# Patient Record
Sex: FEMALE | Race: WHITE | NOT HISPANIC OR LATINO | Employment: UNEMPLOYED | URBAN - METROPOLITAN AREA
[De-identification: names, ages, dates, MRNs, and addresses within clinical notes are randomized per-mention and may not be internally consistent; named-entity substitution may affect disease eponyms.]

---

## 2021-08-20 ENCOUNTER — LAB (OUTPATIENT)
Dept: LAB | Facility: CLINIC | Age: 46
End: 2021-08-20
Payer: COMMERCIAL

## 2021-08-20 ENCOUNTER — OFFICE VISIT (OUTPATIENT)
Dept: BEHAVIORAL HEALTH | Facility: CLINIC | Age: 46
End: 2021-08-20
Payer: COMMERCIAL

## 2021-08-20 VITALS
BODY MASS INDEX: 15.12 KG/M2 | TEMPERATURE: 98 F | WEIGHT: 80 LBS | HEART RATE: 78 BPM | RESPIRATION RATE: 16 BRPM | SYSTOLIC BLOOD PRESSURE: 120 MMHG | DIASTOLIC BLOOD PRESSURE: 64 MMHG

## 2021-08-20 DIAGNOSIS — F17.200 TOBACCO USE DISORDER: ICD-10-CM

## 2021-08-20 DIAGNOSIS — F11.20 OPIOID USE DISORDER, SEVERE, DEPENDENCE (H): Primary | ICD-10-CM

## 2021-08-20 DIAGNOSIS — F15.20 METHAMPHETAMINE USE DISORDER, SEVERE (H): ICD-10-CM

## 2021-08-20 DIAGNOSIS — F11.20 OPIOID USE DISORDER, SEVERE, DEPENDENCE (H): ICD-10-CM

## 2021-08-20 DIAGNOSIS — Z11.3 SCREEN FOR STD (SEXUALLY TRANSMITTED DISEASE): ICD-10-CM

## 2021-08-20 DIAGNOSIS — G43.909 MIGRAINE WITHOUT STATUS MIGRAINOSUS, NOT INTRACTABLE, UNSPECIFIED MIGRAINE TYPE: ICD-10-CM

## 2021-08-20 DIAGNOSIS — D50.9 MICROCYTIC ANEMIA: ICD-10-CM

## 2021-08-20 PROCEDURE — 99205 OFFICE O/P NEW HI 60 MIN: CPT | Performed by: FAMILY MEDICINE

## 2021-08-20 PROCEDURE — 87491 CHLMYD TRACH DNA AMP PROBE: CPT | Performed by: FAMILY MEDICINE

## 2021-08-20 PROCEDURE — 87591 N.GONORRHOEAE DNA AMP PROB: CPT | Performed by: FAMILY MEDICINE

## 2021-08-20 PROCEDURE — 87340 HEPATITIS B SURFACE AG IA: CPT

## 2021-08-20 PROCEDURE — 86706 HEP B SURFACE ANTIBODY: CPT

## 2021-08-20 PROCEDURE — 36415 COLL VENOUS BLD VENIPUNCTURE: CPT

## 2021-08-20 PROCEDURE — 86803 HEPATITIS C AB TEST: CPT

## 2021-08-20 PROCEDURE — 87389 HIV-1 AG W/HIV-1&-2 AB AG IA: CPT

## 2021-08-20 PROCEDURE — 86704 HEP B CORE ANTIBODY TOTAL: CPT

## 2021-08-20 PROCEDURE — 86780 TREPONEMA PALLIDUM: CPT

## 2021-08-20 RX ORDER — BUPRENORPHINE AND NALOXONE 8; 2 MG/1; MG/1
1 FILM, SOLUBLE BUCCAL; SUBLINGUAL DAILY
COMMUNITY
End: 2021-08-20

## 2021-08-20 RX ORDER — BUPRENORPHINE AND NALOXONE 8; 2 MG/1; MG/1
1 FILM, SOLUBLE BUCCAL; SUBLINGUAL 2 TIMES DAILY
Qty: 20 FILM | Refills: 0 | Status: SHIPPED | OUTPATIENT
Start: 2021-08-20 | End: 2021-09-28

## 2021-08-20 RX ORDER — TOPIRAMATE 50 MG/1
TABLET, FILM COATED ORAL
Qty: 60 TABLET | Refills: 0 | Status: SHIPPED | OUTPATIENT
Start: 2021-08-20 | End: 2021-09-28

## 2021-08-20 NOTE — PROGRESS NOTES
[facility] Initial Visit    ASSESSMENT/PLAN                                                      1. Opioid use disorder, severe, dependence (H)  Pt reports a ~3 month history of using heroin/fentanyl by inhaled route(s.)  Last reported use 8/7 or 8/8. Started buprenorphine 8mg/day 8/9/21.  Pt experiencing cravings with current dose of buprenorphine.    Recommend increase in buprenorphine to 16mg/day  Encouraged her to fill rx for naloxone and keep in her possession   Baseline ID screening labs discussed, will obtain today.      - buprenorphine HCl-naloxone HCl (SUBOXONE) 8-2 MG per film; Place 1 Film under the tongue 2 times daily  Dispense: 20 Film; Refill: 0  - naloxone (NARCAN) 4 MG/0.1ML nasal spray; Spray 1 spray (4 mg) into one nostril alternating nostrils once as needed for opioid reversal every 2-3 minutes until assistance arrives  Dispense: 0.2 mL; Refill: 11  - Hepatitis C Screen Reflex to HCV RNA Quant and Genotype; Future  - HIV Antigen Antibody Combo; Future  - Hepatitis B core antibody; Future  - Hepatitis B Surface Antibody; Future  - Hepatitis B surface antigen; Future    2. Methamphetamine use disorder, severe (H)  Starting topiramate as noted to address cravings; pt reports she has tolerated this in the past   - topiramate (TOPAMAX) 50 MG tablet; Start 1 po qhsx7d, then increase to 1 po bid  Dispense: 60 tablet; Refill: 0    3. Tobacco use disorder  MI regarding cessation future visits    4. Migraine without status migrainosus, not intractable, unspecified migraine type  topiramate as noted  - topiramate (TOPAMAX) 50 MG tablet; Start 1 po qhsx7d, then increase to 1 po bid  Dispense: 60 tablet; Refill: 0    5. Screen for STD (sexually transmitted disease)  - Hepatitis C Screen Reflex to HCV RNA Quant and Genotype; Future  - HIV Antigen Antibody Combo; Future  - NEISSERIA GONORRHOEA PCR  - CHLAMYDIA TRACHOMATIS PCR  - Treponema Abs w Reflex to RPR and Titer; Future  -  Hepatitis B core antibody; Future  - Hepatitis B Surface Antibody; Future  - Hepatitis B surface antigen; Future    6. Microcytic anemia  S/p gastric bypass not on supplements likely at least a contributor; will refer to PCP for further evaluation and management.     Return in about 1 week (around 8/27/2021) for Follow up, with me, in person.      SUBJECTIVE                                                      CC/HPI:  Angie Franks is a 46 year old female with PMH bipolar disorder, methamphetamine use disorder starting age 20, tobacco use disorder, and opioid use disorder starting 5/2021 who presents to the Recovery [facility] for initial visit. Pt decided to seek treatment at this time requesting to continue buprenorphine which had been started 8/9/21 by [facility] ED physicians after pt presented from [facility] facility requesting treatment for opioid withdrawal.       Substance Use History :  Opioids: Pt states she began smoking heroin ~5/2021 after being introduced to this by one of her current roommates. She described smoking about 1/4 g daily, last use 8/8/21.  She started buprenorphine  8/9/21 from [facility] ED physicians after she presented there seeking treatment for opioid withdrawal.    IV drug use: No   History of overdose: No  Previous treatments : Yes: 2x including [facility] and [facility] 2019  Longest period of sobriety: 7 years from methamphetamine while incarcerated  Medical complications related to substance use: per patient, low blood pressure, syncope  Hepatitis C Status: negative per patient, last screen 2017  HIV Status: negative per patient, last screen 2016    Other recent substance use:  Stimulants: Angie's drug of choice is meth; hx of meth addiction x 20-25 years. Was in retirement x~5 years until July 2019. Returned to use of methamphetamine upon release from retirement. She did attend treatment at [facility] after relapsing, and remained sober from methamphetamine until 12/2020.  Last  use 8/07/21  Sedatives/hypnotics/anxiolytics: Xanax, snorted, would use if no money for heroin. Last use 8/8/21  Alcohol: denies  Tobacco: yes  Cannabis: yes, in process to obtain medical marijuana. Will use to increase appetite  Hallucinogens: denies  Behavioral addictions: denies     Patient reports her mother used heroin when pregnant with her.    [place] Prescription Drug Monitoring Program Reviewed:  Yes; as expected    Past Medical History:   Diagnosis Date     ASCUS favoring benign 09/28/2015    negative HPV     Bipolar 1 disorder (H)      Fibromyalgia 08/01/2012     Hx of herpes genitalis 09/28/2015     Obesity      Periodic headache syndrome, not intractable      Social anxiety disorder      PAST PSYCHIATRIC HISTORY:  Diagnoses- bipolar disorder, anxiety, PTSD  Suicide Attempts: No   Hospitalizations: Yes, age 14  No current mental health providers. Was a patient at [facility] prior to going to [facility] 5 years ago.     Past Surgical History:   Procedure Laterality Date     ABDOMEN SURGERY  Age 15    Laproscopy, Endometriosis     CHOLECYSTECTOMY  01/01/2010     GASTRIC BYPASS  04/2013     GYN SURGERY      Tubal pregnancy repair     TUBAL LIGATION  2013       Medications:  Buprenorphine/naloxone 8mg/2mg film, taking 1/2 SL bid  No current outpatient medications on file prior to visit.  No current facility-administered medications on file prior to visit.      Allergies   Allergen Reactions     Codeine Sulfate Hives     Erythromycin      Morphine Sulfate Other (See Comments)     Localized redness if given IV         Family History   Problem Relation Age of Onset     Substance Abuse Mother          Social History  Housing status: with friends, stable housing. Roommate uses heroin.  Employment status: Unemployed, not seeking work  Relationship status:   Children: 5 children--3 children over the age of 18, 1 child adopted out (age 17), 1 child (age 14) with his parental grandparents    REVIEW OF  SYSTEMS:  Endorses cravings with current dose of buprenorphine.   Also endorses occasional cravings for methamphetamine.   Has been experiencing daily posterior headaches lately      OBJECTIVE                                                        Clinical Opioid Withdrawal Scale (COWS)    Resting Pulse Rate  0  =  <=80    Sweating    (over past 1/2 hour) 1  =  subjective report of chills or flushing   Restlessness  1  =  reports difficulty sitting still, but is able to do so   Pupil size  0  =  pupils pinned or normal size for room light   Bone or Joint Aches    (acute only) 1  =  mild diffuse discomfort   Runny nose or tearing    (unrelated to cold/allergies) 2  =  nose running or tearing   GI Upset    (over past 1/2 hour) 2  =  nausea or loose stool   Tremor    (outstretched hands) 2  =  slight tremor observable   Yawning    (during assessment) 0  =  no yawning   Anxiety/Irritability 1  =  patient reports increasing irritability or anxiousness   Gooseflesh skin 3  =  piloerection or skin can be felt or hairs standing up on arms     TOTAL SCORE  Add column for score   13       /64   Pulse 78   Temp 98  F (36.7  C)   Resp 16   Wt 36.3 kg (80 lb)   LMP 07/18/2021 (Approximate)   BMI 15.12 kg/m      Physical Exam  Constitutional:       Appearance: She is not toxic-appearing.   HENT:      Head: Normocephalic and atraumatic.   Eyes:      General: No scleral icterus.     Extraocular Movements: Extraocular movements intact.   Pulmonary:      Effort: Pulmonary effort is normal.   Neurological:      Mental Status: She is alert and oriented to person, place, and time.      Cranial Nerves: No facial asymmetry.      Coordination: Coordination normal.      Gait: Gait normal.   Psychiatric:         Attention and Perception: Attention and perception normal.         Mood and Affect: Mood is anxious and depressed. Affect is tearful.         Speech: Speech is rapid and pressured.         Behavior: Behavior is  cooperative.         Thought Content: Thought content does not include suicidal ideation.      Comments: Insight and judgement are fair         Labs:    UDS: amphetamines, buprenorphine, methamphetamines and THC pt states last use of methamphetamine was 8/7/21  *[facility] urine drug screen does not screen for Fentanyl    HCV, HBV, HIV studies pending at time of documentation    Recent Results (from the past 240 hour(s))   NEISSERIA GONORRHOEA PCR    Collection Time: 08/20/21 12:19 PM    Specimen: Urine, Voided   Result Value Ref Range    Neisseria gonorrhoeae Negative Negative   CHLAMYDIA TRACHOMATIS PCR    Collection Time: 08/20/21 12:19 PM    Specimen: Urine, Voided   Result Value Ref Range    Chlamydia trachomatis Negative Negative   Treponema Abs w Reflex to RPR and Titer    Collection Time: 08/20/21 12:23 PM   Result Value Ref Range    Treponema Antibody Total Nonreactive Nonreactive       7/8/21 from [facility]:   Ref Range & Units 1 mo ago   WBC 4.00 - 10.00 k/cmm 4.56        RBC 3.90 - 5.20 m/cmm 4.50        Hgb 11.5 - 15.7 g/dL 7.8Low         Hematocrit 34.0 - 45.0 % 28.8Low         MCV 80.0 - 100.0 fL 64.0Low         MCH 25.0 - 32.0 pg 17.3Low         MCHC 31.0 - 36.0 g/dL 27.1Low         RDW 11.5 - 14.5 % 17.1High         Plt 150 - 400 k/cmm 487High         MPV 6.5 - 12.5 fL 9.4        Automated Abs Neutrophil 1.70 - 6.50 k/cmm 1.99    Comment: Preliminary ANC, final result to follow.       NRBC 0.0 - 0.0 % 0.0        Abs Immature Granulocyte 0.00 - 0.09 k/cmm 0.01    Comment: The Immature Granulocyte Absolute count contains metamyelocytes and myelocytes.       Abs Neutrophil 1.70 - 6.50 k/cmm 1.99        Abs Lymphocyte 0.80 - 4.00 k/cmm 2.13        Abs Monocyte 0.20 - 1.00 k/cmm 0.32        Abs Eosinophil 0.00 - 0.60 k/cmm 0.08        Abs Basophil 0.00 - 0.20 k/cmm 0.03        Hypochromasi   Slight        Elliptocyte   Slight        Resulting Agency  [facility]   Specimen Collected: 07/08/21 12:15 PM       Ref Range & Units 1 mo ago   TSH 0.27 - 4.20 mIU/L 1.83      Ref Range & Units 1 mo ago    Sodium 135 - 148 mEq/L 137        Potassium 3.5 - 5.3 mEq/L 3.6        Chloride 92 - 108 mEq/L 105        CO2 22 - 30 mEq/L 24        AnGap 8 - 16 mEq/L 8        Glucose 70 - 100 mg/dL 105High         BUN 6 - 20 mg/dL 9        Creatinine 0.50 - 1.00 mg/dL 0.59        Calcium 8.6 - 10.0 mg/dL 8.8        eGFR, High >=60 ml/min/1.73m2 >120    Comment: Calculated using CKD-EPI equation       eGFR, Low >=60 ml/min/1.73m2 110    Comment: Calculated using CKD-EPI equation           Patient counseling completed today:  Discussed mechanism of action, potential risks/benefits/side effects of medications and other recommendations above.  Discussed risk of precipitated withdrawal with initiation/resumption of buprenorphine in the presence of full opioid agonists.  Reviewed directions for initiation/resumption of buprenorphine to reduce risk of precipitated withdrawal and maximize efficacy.  Recommend pt keep naloxone in their possession and reviewed other aspects of harm reduction to reduce risk of overdose with return to use.   Recommended avoiding concurrent use of alcohol, benzodiazepines or other sedatives with buprenorphine or other opioids.  Discussed importance of avoiding isolation, building a network of supportive relationships, avoiding people/places/things associated with past use to reduce risk of relapse; including motivational interviewing regarding psychosocial treatment for addiction.     At least 60 min spent in review of medical record,  review, obtaining histories, reviewing symptoms, discussing pt's goals for treatment, counseling noted above.    [facility]  [address] [place] [address] [id number] [phone]

## 2021-08-21 LAB
C TRACH DNA SPEC QL NAA+PROBE: NEGATIVE
N GONORRHOEA DNA SPEC QL NAA+PROBE: NEGATIVE
T PALLIDUM AB SER QL: NONREACTIVE

## 2021-08-23 LAB
HBV CORE AB SERPL QL IA: NONREACTIVE
HBV SURFACE AB SERPL IA-ACNC: 153.1 M[IU]/ML
HBV SURFACE AG SERPL QL IA: NONREACTIVE
HCV AB SERPL QL IA: NONREACTIVE
HIV 1+2 AB+HIV1 P24 AG SERPL QL IA: NONREACTIVE

## 2021-08-27 ENCOUNTER — TELEPHONE (OUTPATIENT)
Dept: BEHAVIORAL HEALTH | Facility: CLINIC | Age: 46
End: 2021-08-27

## 2021-09-25 ENCOUNTER — HOSPITAL ENCOUNTER (EMERGENCY)
Facility: CLINIC | Age: 46
Discharge: HOME | End: 2021-09-25
Admitting: EMERGENCY MEDICINE
Payer: COMMERCIAL

## 2021-09-25 VITALS
DIASTOLIC BLOOD PRESSURE: 71 MMHG | SYSTOLIC BLOOD PRESSURE: 114 MMHG | RESPIRATION RATE: 16 BRPM | OXYGEN SATURATION: 100 % | HEART RATE: 107 BPM | TEMPERATURE: 98.3 F

## 2021-09-25 PROCEDURE — 250N000013 HC RX MED GY IP 250 OP 250 PS 637: Performed by: EMERGENCY MEDICINE

## 2021-09-25 PROCEDURE — 250N000011 HC RX IP 250 OP 636: Performed by: EMERGENCY MEDICINE

## 2021-09-25 PROCEDURE — 999N000104 HC STATISTIC NO CHARGE

## 2021-09-25 RX ORDER — ONDANSETRON 2 MG/ML
4 INJECTION INTRAMUSCULAR; INTRAVENOUS ONCE
Status: DISCONTINUED | OUTPATIENT
Start: 2021-09-25 | End: 2021-09-25

## 2021-09-25 RX ORDER — IBUPROFEN 600 MG/1
600 TABLET, FILM COATED ORAL ONCE
Status: COMPLETED | OUTPATIENT
Start: 2021-09-25 | End: 2021-09-25

## 2021-09-25 RX ORDER — ONDANSETRON 4 MG/1
4 TABLET, ORALLY DISINTEGRATING ORAL ONCE
Status: COMPLETED | OUTPATIENT
Start: 2021-09-25 | End: 2021-09-25

## 2021-09-25 RX ADMIN — IBUPROFEN 600 MG: 600 TABLET ORAL at 21:31

## 2021-09-25 RX ADMIN — ONDANSETRON 4 MG: 4 TABLET, ORALLY DISINTEGRATING ORAL at 21:31

## 2021-09-26 NOTE — ED NOTES
Pt has been called more than 3x in the lobby and pt not be found. Pt did not inform any staff that she was leaving. Medical declination form was not given to the pt

## 2021-09-26 NOTE — ED TRIAGE NOTES
Pt reports being on heroin in the past and went to [facility] for detox. Pt was then sent to [facility] and started on suboxone. Pt was wanting to get off suboxone and stopped taking it last dose Friday AM 4 mg (1/2 her normal dose). Pt presents with body aches, nausea, diarrhea. Pt wanting to get back on suboxone.

## 2021-09-28 ENCOUNTER — HOSPITAL ENCOUNTER (EMERGENCY)
Facility: CLINIC | Age: 46
Discharge: STILL ADMITTED OR ADMINISTRATIVE | End: 2021-09-28
Payer: COMMERCIAL

## 2021-09-28 ENCOUNTER — TELEPHONE (OUTPATIENT)
Dept: BEHAVIORAL HEALTH | Facility: CLINIC | Age: 46
End: 2021-09-28

## 2021-09-28 ENCOUNTER — OFFICE VISIT (OUTPATIENT)
Dept: BEHAVIORAL HEALTH | Facility: CLINIC | Age: 46
End: 2021-09-28
Payer: COMMERCIAL

## 2021-09-28 VITALS — HEART RATE: 97 BPM | SYSTOLIC BLOOD PRESSURE: 126 MMHG | DIASTOLIC BLOOD PRESSURE: 86 MMHG

## 2021-09-28 VITALS
HEART RATE: 112 BPM | RESPIRATION RATE: 18 BRPM | SYSTOLIC BLOOD PRESSURE: 111 MMHG | DIASTOLIC BLOOD PRESSURE: 64 MMHG | TEMPERATURE: 98 F | OXYGEN SATURATION: 99 %

## 2021-09-28 DIAGNOSIS — F15.20 METHAMPHETAMINE USE DISORDER, SEVERE (H): ICD-10-CM

## 2021-09-28 DIAGNOSIS — F17.200 TOBACCO USE DISORDER: ICD-10-CM

## 2021-09-28 DIAGNOSIS — F11.20 OPIOID USE DISORDER, SEVERE, DEPENDENCE (H): Primary | ICD-10-CM

## 2021-09-28 LAB — HCG UR QL: NEGATIVE

## 2021-09-28 PROCEDURE — 81025 URINE PREGNANCY TEST: CPT | Performed by: FAMILY MEDICINE

## 2021-09-28 PROCEDURE — 80307 DRUG TEST PRSMV CHEM ANLYZR: CPT | Performed by: FAMILY MEDICINE

## 2021-09-28 PROCEDURE — 99214 OFFICE O/P EST MOD 30 MIN: CPT | Performed by: FAMILY MEDICINE

## 2021-09-28 RX ORDER — BUPRENORPHINE AND NALOXONE 8; 2 MG/1; MG/1
1 FILM, SOLUBLE BUCCAL; SUBLINGUAL 2 TIMES DAILY
Qty: 20 FILM | Refills: 0 | Status: SHIPPED | OUTPATIENT
Start: 2021-09-28 | End: 2021-10-22

## 2021-09-28 NOTE — NURSING NOTE
[facility]      Rooming information:  Approximate last use of illicit opioids: 8/8/2021  Taking buprenorphine? No, last took on 9/25/2021 As prescribed? No, states was only taking 1/2 a strip a day  Number of buprenorphine films/tablets remaining currently: 0  Side effects related to buprenorphine (constipation, dry mouth, sedation?) No   Narcan currently available: Yes  Other recent substance use:    NICOTINE-Yes:    Methamphetamines-9/26/2021, CBD  If using nicotine, ready to quit? No    Point of care urine drug screen positive for: amphetamines, methamphetamines and THC  *[facility] urine drug screen does not screen for Fentanyl    Pregnancy Status   LMP: unknown  Birth control/barriers: tubal  Urine pregnancy test specimen obtained and sent to lab:yes    Insurance needs: active    Housing needs: stable    Contact information up to date? yes    3rd Party Involvement none today (please obtain RANDI if pt would like to include)    Primary care provider: [facility]     Mental health provider: [facility] (follow up on MH referral if needed)    Zoe Rivera CMA  September 28, 2021  1:33 PM

## 2021-09-28 NOTE — TELEPHONE ENCOUNTER
She wants to get back on Soboxone-she jumped to I don't want to be on it forever-she talked about being sick and the frustration of trying to get help-she wants to be sober bad because she is sick of being and addict-she wants to move the addiction-she wants to be able to talk to teenager to help then with recovery-live by my example and I'm here to help-she can see the self-she will have a script in her hand and be able to think I'm starting my life to recovery today and on my way to feeling better if and when a challenge presents its self.

## 2021-09-28 NOTE — PROGRESS NOTES
[facility] Return Visit    ASSESSMENT/PLAN                                                    1. Opioid use disorder, severe, dependence (H)  Pt experiencing withdrawal symptoms after self directed taper off buprenorphine, would like to resume buprenorphine, and is interested in XR buprenorphine.  Last use other opioids 8/8/21.   Resume buprenorphine with 4mg, titrate up to 16mg/day as needed to address symptoms.   Will make arrangements for Sublocade when pt has been taking at least 8mg sublingual buprenorphine daily for one week.   - HCG qualitative urine; Standing  - Fentanyl Urine, Qualitative; Standing  - buprenorphine HCl-naloxone HCl (SUBOXONE) 8-2 MG per film; Place 1 Film under the tongue 2 times daily  Dispense: 20 Film; Refill: 0  - HCG qualitative urine  - Fentanyl Urine, Qualitative    2. Methamphetamine use disorder, severe (H)  Pt reporting one use in the last month on 9/26/21, noted decreased cravings with use of buprenorphine.      3. Tobacco use disorder  Not interested in quitting at this time        Return in about 1 week (around 10/5/2021) for Follow up, with me, in person.      SUBJECTIVE                                                      CC/HPI:  Angie Franks is a 46 year old female with PMH bipolar disorder, methamphetamine use disorder starting age 20, tobacco use disorder, and opioid use disorder starting 5/2021 who presents to the [facility] for return visit.    Brief History:   Pt was first seen in Recovery Clinic on 8/20/21.   Pt came to Recovery Clinic requesting to continue buprenorphine which had been started 8/9/21 by [facility] physicians after pt presented from [facility] requesting treatment for opioid withdrawal.   Substance Use History :  Opioids: Pt states she began smoking heroin ~5/2021 after being introduced to this by one of her current roommates. She described smoking about 1/4 g daily, last use 8/8/21.  She started  buprenorphine  8/9/21 from [facility] ED physicians after she presented there seeking treatment for opioid withdrawal.    IV drug use: No   History of overdose: No  Previous treatments : Yes: 2x including [facility] 2019  Longest period of sobriety: 7 years from methamphetamine while incarcerated  Medical complications related to substance use: per patient, low blood pressure, syncope  Hepatitis C Status: 8/20/21 HCV ab nonreactive  HIV Status: 8/20/21 HIV ag/ab nonreactive    Other recent substance use:  Stimulants: Angie's drug of choice is meth; hx of meth addiction x 20-25 years. Was in prison x~5 years until July 2019. Returned to use of methamphetamine upon release from prison. She did attend treatment at [facility] after relapsing, and remained sober from methamphetamine until 12/2020.  Last use 8/07/21  Sedatives/hypnotics/anxiolytics: Xanax, snorted, would use if no money for heroin. Last use 8/8/21  Alcohol: denies  Tobacco: yes  Cannabis: yes, in process to obtain medical marijuana. Will use to increase appetite  Hallucinogens: denies  Behavioral addictions: denies     I last met with pt on 8/20/21.   A/P at that time was  1. Opioid use disorder, severe, dependence (H)  Pt reports a ~3 month history of using heroin/fentanyl by inhaled route(s.)  Last reported use 8/7 or 8/8. Started buprenorphine 8mg/day 8/9/21.  Pt experiencing cravings with current dose of buprenorphine.    Recommend increase in buprenorphine to 16mg/day  Encouraged her to fill rx for naloxone and keep in her possession   Baseline ID screening labs discussed, will obtain today.      - buprenorphine HCl-naloxone HCl (SUBOXONE) 8-2 MG per film; Place 1 Film under the tongue 2 times daily  Dispense: 20 Film; Refill: 0  - naloxone (NARCAN) 4 MG/0.1ML nasal spray; Spray 1 spray (4 mg) into one nostril alternating nostrils once as needed for opioid reversal every 2-3 minutes until assistance arrives  Dispense: 0.2 mL; Refill:  11  - Hepatitis C Screen Reflex to HCV RNA Quant and Genotype; Future  - HIV Antigen Antibody Combo; Future  - Hepatitis B core antibody; Future  - Hepatitis B Surface Antibody; Future  - Hepatitis B surface antigen; Future    2. Methamphetamine use disorder, severe (H)  Starting topiramate as noted to address cravings; pt reports she has tolerated this in the past   - topiramate (TOPAMAX) 50 MG tablet; Start 1 po qhsx7d, then increase to 1 po bid  Dispense: 60 tablet; Refill: 0    3. Tobacco use disorder  MI regarding cessation future visits    4. Migraine without status migrainosus, not intractable, unspecified migraine type  topiramate as noted  - topiramate (TOPAMAX) 50 MG tablet; Start 1 po qhsx7d, then increase to 1 po bid  Dispense: 60 tablet; Refill: 0    5. Screen for STD (sexually transmitted disease)  - Hepatitis C Screen Reflex to HCV RNA Quant and Genotype; Future  - HIV Antigen Antibody Combo; Future  - NEISSERIA GONORRHOEA PCR  - CHLAMYDIA TRACHOMATIS PCR  - Treponema Abs w Reflex to RPR and Titer; Future  - Hepatitis B core antibody; Future  - Hepatitis B Surface Antibody; Future  - Hepatitis B surface antigen; Future    6. Microcytic anemia  S/p gastric bypass not on supplements likely at least a contributor; will refer to PCP for further evaluation and management.     Today, pt states she took buprenorphine 8mg/day for about one week after her last visit, then began tapering her dose with goal of discontinuing buprenorphine.  Last dose of buprenorphine per pt was 9/25/21, 4mg.  She states she has been experiencing withdrawal symptoms and wants to resume buprenorphine.  She endorses cravings for opioids, but denies use.  She states she used methamphetamine once in the last few days, otherwise no use since last visit.  She stopped topiramate due to decreased appetite she experienced.          [place] Prescription Drug Monitoring Program Reviewed:  Yes; as expected    Past Medical History:    Diagnosis Date     ASCUS favoring benign 09/28/2015    negative HPV     Bipolar 1 disorder (H)      Fibromyalgia 08/01/2012     Hx of herpes genitalis 09/28/2015     Obesity      Periodic headache syndrome, not intractable      Social anxiety disorder      PAST PSYCHIATRIC HISTORY:  Diagnoses- bipolar disorder, anxiety, PTSD  Suicide Attempts: No   Hospitalizations: Yes, age 14  No current mental health providers. Was a patient at [facility] prior to going to prison 5 years ago.     Past Surgical History:   Procedure Laterality Date     ABDOMEN SURGERY  Age 15    Laproscopy, Endometriosis     CHOLECYSTECTOMY  01/01/2010     GASTRIC BYPASS  04/2013     GYN SURGERY      Tubal pregnancy repair     TUBAL LIGATION  2013       Medications:  Buprenorphine/naloxone 8mg/2mg film, taking 1/2 SL bid  buprenorphine HCl-naloxone HCl (SUBOXONE) 8-2 MG per film, Place 1 Film under the tongue 2 times daily (Patient not taking: Reported on 9/28/2021)  naloxone (NARCAN) 4 MG/0.1ML nasal spray, Spray 1 spray (4 mg) into one nostril alternating nostrils once as needed for opioid reversal every 2-3 minutes until assistance arrives (Patient not taking: Reported on 9/28/2021)  topiramate (TOPAMAX) 50 MG tablet, Start 1 po qhsx7d, then increase to 1 po bid (Patient not taking: Reported on 9/28/2021)    No current facility-administered medications on file prior to visit.      Allergies   Allergen Reactions     Codeine Sulfate Hives     Erythromycin      Morphine Sulfate Other (See Comments)     Localized redness if given IV         Family History   Problem Relation Age of Onset     Substance Abuse Mother          Social History  Housing status: with friends, stable housing. Roommate uses heroin.  Employment status: Unemployed, not seeking work  Relationship status:   Children: 5 children--3 children over the age of 18, 1 child adopted out (age 17), 1 child (age 14) with his parental grandparents      REVIEW OF SYSTEMS:  Wants to  return to see her PCP to address her other health concerns.  States she plans to do this.       OBJECTIVE                                                      /86   Pulse 97   LMP  (LMP Unknown)     Physical Exam  Constitutional:       Appearance: She is not toxic-appearing.   HENT:      Head: Normocephalic and atraumatic.   Eyes:      General: No scleral icterus.     Extraocular Movements: Extraocular movements intact.   Pulmonary:      Effort: Pulmonary effort is normal.   Neurological:      Mental Status: She is alert and oriented to person, place, and time.      Cranial Nerves: No facial asymmetry.      Coordination: Coordination normal.      Gait: Gait normal.   Psychiatric:         Attention and Perception: Attention and perception normal.         Speech: Speech normal.         Behavior: Behavior is cooperative.         Thought Content: Thought content normal.      Comments: Insight and judgement are fair  Mood is irritable, affect restricted         Labs:    UDS: amphetamines, methamphetamines and THC   *POC urine drug screen does not screen for Fentanyl    Recent Results (from the past 720 hour(s))   HCG qualitative urine    Collection Time: 09/28/21  2:58 PM   Result Value Ref Range    hCG Urine Qualitative Negative Negative             Patient counseling completed today:  Discussed mechanism of action, potential risks/benefits/side effects of medications and other recommendations above.  Recommend pt keep naloxone in their possession and reviewed other aspects of harm reduction to reduce risk of overdose with return to use.   Recommended avoiding concurrent use of alcohol, benzodiazepines or other sedatives with buprenorphine or other opioids.  Discussed importance of avoiding isolation, building a network of supportive relationships, avoiding people/places/things associated with past use to reduce risk of relapse; including motivational interviewing regarding psychosocial treatment for addiction.      At least 30 min spent in review of medical record,  review, obtaining histories, reviewing symptoms, discussing pt's goals for treatment, counseling noted above.    [facility]  [address] [place] [id number] [phone]

## 2021-09-29 LAB — FENTANYL UR QL: ABNORMAL

## 2021-10-06 ENCOUNTER — TELEPHONE (OUTPATIENT)
Dept: BEHAVIORAL HEALTH | Facility: CLINIC | Age: 46
End: 2021-10-06

## 2021-10-22 DIAGNOSIS — F11.20 OPIOID USE DISORDER, SEVERE, DEPENDENCE (H): ICD-10-CM

## 2021-10-22 RX ORDER — BUPRENORPHINE AND NALOXONE 8; 2 MG/1; MG/1
1 FILM, SOLUBLE BUCCAL; SUBLINGUAL DAILY
Qty: 7 FILM | Refills: 0 | Status: SHIPPED | OUTPATIENT
Start: 2021-10-22

## 2021-10-22 NOTE — TELEPHONE ENCOUNTER
"Date of Last Office Visit: 9/28/21  Date of Next Office Visit: 10/27/21  No shows since last visit: 1  Cancellations since last visit: 0    Medication requested: Suboxone 8-2 mg Date last ordered: 9/28/21 Qty: 20 films Refills: 0     [place] of [place] ?: Yes  Medication last filled date: 9/28/21 Qty filled: 20 films  Other [place] substance [place] ?: No    Lapse in medication adherence greater than 5 days?: Yes  If yes, call patient and gather details: Reports taking 1/2 to 1 film daily, instead of 2, as \"the 2 films is way too strong for me.\" Denies opioid cravings taking 1/2 to 1 films daily. Requesting Rx be altered to 1 film daily. Requesting a call once provider has approved or denied refill.  Medication refill request verified as identical to current order?: Yes  Result of Last DAM, VPA, Li+ Level, CBC, or Carbamazepine Level (at or since last visit): N/A    Last visit treatment plan:     1. Opioid use disorder, severe, dependence (H)  Pt experiencing withdrawal symptoms after self directed taper off buprenorphine, would like to resume buprenorphine, and is interested in XR buprenorphine.  Last use other opioids 8/8/21.   Resume buprenorphine with 4mg, titrate up to 16mg/day as needed to address symptoms.   Will make arrangements for Sublocade when pt has been taking at least 8mg sublingual buprenorphine daily for one week.   - HCG qualitative urine; Standing  - Fentanyl Urine, Qualitative; Standing  - buprenorphine HCl-naloxone HCl (SUBOXONE) 8-2 MG per film; Place 1 Film under the tongue 2 times daily  Dispense: 20 Film; Refill: 0  - HCG qualitative urine  - Fentanyl Urine, Qualitative     2. Methamphetamine use disorder, severe (H)  Pt reporting one use in the last month on 9/26/21, noted decreased cravings with use of buprenorphine.       3. Tobacco use disorder  Not interested in quitting at this time     Return in about 1 week (around 10/5/2021) for Follow up, with me, in person.      []Medication " refilled per  Medication Refill in Ambulatory Care  policy.  [x]Medication unable to be refilled by RN due to criteria not met as indicated below:    []Eligibility - not seen in the last year   []Supervision - no future appointment   [x]Compliance - no shows, cancellations or lapse in therapy   []Verification - order discrepancy   [x]Controlled medication   []Medication not included in policy   []90-day supply request   []Other

## 2021-10-22 NOTE — TELEPHONE ENCOUNTER
Medication Request:  Due to: missed appointment    Request for buprenorphine HCl-naloxone HCl (SUBOXONE) 8-2mg    Preferred pharmacy and location: [facility] [address], [place] [phone]    Other Information:   Taking as prescribed: Yes  Last dose: 10/22     Phone number patient can be reached at: [phone]    Can we leave a detailed message on this number? Yes     Pt is scheduled to see  10/27/21 at 9 am in person. Pt stated she will run out today. Writer invited pt to come in for walk in, but she states she has a cough and a runny nose and her primary care has advised her to stay home and limit contacts. Please further assist.    Patient informed to follow up with pharmacy for status of refill as [facility] RN will only reach out if there are any issues or questions. Patientt also informed that this request for a bridge is simply a request and doesn't guarantee the medication will be filled.    Routing to [facility] RN ([phone])    [facility]  [facility] [address], [address]   [place], [place], [address]  Phone: [phone]  Fax: [phone]    Open Mon, Wed, Fridays  9:00am-4:00pm  Walk in hours: 9am-3pm    Open Tues and Thursdays  Walk-in only 1:30-3pm    Peter Underwood on 10/22/2021 at 11:23 AM

## 2021-10-27 ENCOUNTER — TELEPHONE (OUTPATIENT)
Dept: BEHAVIORAL HEALTH | Facility: CLINIC | Age: 46
End: 2021-10-27

## 2021-10-27 NOTE — TELEPHONE ENCOUNTER
Writer called patient due to no show today at the [facility], clinic phone number left and walk in hours

## 2022-02-06 ENCOUNTER — HEALTH MAINTENANCE LETTER (OUTPATIENT)
Age: 47
End: 2022-02-06

## 2022-07-20 ENCOUNTER — HOSPITAL ENCOUNTER (EMERGENCY)
Facility: CLINIC | Age: 47
Discharge: HOME | End: 2022-07-20
Attending: NURSE PRACTITIONER | Admitting: NURSE PRACTITIONER
Payer: COMMERCIAL

## 2022-07-20 VITALS
HEIGHT: 60 IN | SYSTOLIC BLOOD PRESSURE: 112 MMHG | BODY MASS INDEX: 19.63 KG/M2 | HEART RATE: 96 BPM | TEMPERATURE: 98 F | DIASTOLIC BLOOD PRESSURE: 73 MMHG | OXYGEN SATURATION: 97 % | WEIGHT: 100 LBS

## 2022-07-20 DIAGNOSIS — R30.0 DYSURIA: ICD-10-CM

## 2022-07-20 LAB
ALBUMIN UR-MCNC: NEGATIVE MG/DL
APPEARANCE UR: CLEAR
BILIRUB UR QL STRIP: NEGATIVE
COLOR UR AUTO: YELLOW
GLUCOSE UR STRIP-MCNC: NEGATIVE MG/DL
HGB UR QL STRIP: NEGATIVE
KETONES UR STRIP-MCNC: NEGATIVE MG/DL
LEUKOCYTE ESTERASE UR QL STRIP: NEGATIVE
MUCOUS THREADS #/AREA URNS LPF: PRESENT /LPF
NITRATE UR QL: NEGATIVE
PH UR STRIP: 6 [PH] (ref 5–7)
RBC URINE: <1 /HPF
SP GR UR STRIP: 1.03 (ref 1–1.03)
SQUAMOUS EPITHELIAL: <1 /HPF
UROBILINOGEN UR STRIP-MCNC: NORMAL MG/DL
WBC URINE: 1 /HPF

## 2022-07-20 PROCEDURE — 99212 OFFICE O/P EST SF 10 MIN: CPT | Performed by: NURSE PRACTITIONER

## 2022-07-20 PROCEDURE — G0463 HOSPITAL OUTPT CLINIC VISIT: HCPCS | Performed by: NURSE PRACTITIONER

## 2022-07-20 PROCEDURE — 81001 URINALYSIS AUTO W/SCOPE: CPT | Performed by: NURSE PRACTITIONER

## 2022-07-20 RX ORDER — QUETIAPINE FUMARATE 50 MG/1
TABLET, FILM COATED ORAL
COMMUNITY
Start: 2022-07-19

## 2022-07-20 RX ORDER — QUETIAPINE FUMARATE 200 MG/1
TABLET, FILM COATED ORAL
COMMUNITY
Start: 2022-07-10

## 2022-07-20 RX ORDER — QUETIAPINE FUMARATE 25 MG/1
25 TABLET, FILM COATED ORAL
COMMUNITY
Start: 2022-07-06

## 2022-07-20 RX ORDER — CLONIDINE 0.1 MG/24H
PATCH, EXTENDED RELEASE TRANSDERMAL
COMMUNITY
Start: 2022-07-05

## 2022-07-20 RX ORDER — LANOLIN ALCOHOL/MO/W.PET/CERES
100 CREAM (GRAM) TOPICAL
COMMUNITY
Start: 2022-07-06

## 2022-07-20 RX ORDER — NICOTINE 21 MG/24HR
PATCH, TRANSDERMAL 24 HOURS TRANSDERMAL
COMMUNITY
Start: 2022-07-05

## 2022-07-20 NOTE — ED PROVIDER NOTES
History     Chief Complaint   Patient presents with     Rule out Urinary Tract Infection     HPI  Patient with a 3 days history of problems with dysuria and hesitancy. Patients has history of occ UTIs and no previous history of kidney stones.  Patient has not tried any therapies or treatments for her current symptoms.  Sexually active: no.  Associated symptoms:  Fever: no noted fevers  Other symptoms: denies vaginal sores/lesions  Recent illnesses: none  Patient currently at [facility] for treatment    Allergies:  Allergies   Allergen Reactions     Codeine Sulfate Hives     Erythromycin      Morphine Sulfate Other (See Comments)     Localized redness if given IV         Problem List:    Patient Active Problem List    Diagnosis Date Noted     Hx of herpes genitalis 09/28/2015     Priority: Medium        Past Medical History:    Past Medical History:   Diagnosis Date     ASCUS favoring benign 09/28/2015     Bipolar 1 disorder (H)      Fibromyalgia 08/01/2012     Hx of herpes genitalis 09/28/2015     Obesity      Periodic headache syndrome, not intractable      Social anxiety disorder        Past Surgical History:    Past Surgical History:   Procedure Laterality Date     ABDOMEN SURGERY  Age 15    Laproscopy, Endometriosis     CHOLECYSTECTOMY  01/01/2010     GASTRIC BYPASS  04/2013     GYN SURGERY      Tubal pregnancy repair     TUBAL LIGATION  2013       Family History:    Family History   Problem Relation Age of Onset     Substance Abuse Mother        Social History:  Marital Status:   [4]  Social History     Tobacco Use     Smoking status: Former Smoker     Packs/day: 0.50     Types: Cigarettes     Smokeless tobacco: Never Used   Substance Use Topics     Alcohol use: No     Drug use: Yes     Types: Opiates, Methamphetamines, Benzodiazepines, Marijuana        Medications:    cholecalciferol 50 MCG (2000 UT) tablet  cloNIDine (CATAPRES-TTS1) 0.1 MG/24HR WK patch  QUEtiapine (SEROQUEL) 25 MG  tablet  thiamine (B-1) 100 MG tablet  vitamin B-12 (CYANOCOBALAMIN) 1000 MCG tablet  buprenorphine HCl-naloxone HCl (SUBOXONE) 8-2 MG per film  naloxone (NARCAN) 4 MG/0.1ML nasal spray  nicotine (NICODERM CQ) 21 MG/24HR 24 hr patch  QUEtiapine (SEROQUEL) 200 MG tablet  QUEtiapine (SEROQUEL) 50 MG tablet          Review of Systems  As mentioned above in the history present illness. All other systems were reviewed and are negative.    Physical Exam   BP: 112/73  Pulse: 96  Temp: 98  F (36.7  C)  Height: 152.4 cm (5')  Weight: 45.4 kg (100 lb)  SpO2: 97 %      Physical Exam  Vitals and nursing note reviewed.   Constitutional:       General: She is not in acute distress.     Appearance: She is well-developed. She is not diaphoretic.   HENT:      Head: Normocephalic and atraumatic.   Eyes:      General: No scleral icterus.        Right eye: No discharge.         Left eye: No discharge.      Conjunctiva/sclera: Conjunctivae normal.   Cardiovascular:      Rate and Rhythm: Regular rhythm.      Heart sounds: Normal heart sounds. No murmur heard.    No friction rub.   Pulmonary:      Effort: Pulmonary effort is normal. No respiratory distress.      Breath sounds: Normal breath sounds. No stridor. No wheezing or rales.   Chest:      Chest wall: No tenderness.   Abdominal:      General: Bowel sounds are normal. There is no distension.      Palpations: Abdomen is soft.      Tenderness: There is abdominal tenderness in the suprapubic area. There is no right CVA tenderness, left CVA tenderness or guarding.   Musculoskeletal:      Cervical back: Normal range of motion and neck supple.   Skin:     General: Skin is warm and dry.      Coloration: Skin is not pale.      Findings: No erythema or rash.   Neurological:      Mental Status: She is alert and oriented to person, place, and time.         ED Course                 Procedures    Results for orders placed or performed during the hospital encounter of 07/20/22 (from the past 24  hour(s))   UA with Microscopic reflex to Culture    Specimen: Urine, Clean Catch   Result Value Ref Range    Color Urine Yellow Colorless, Straw, Light Yellow, Yellow    Appearance Urine Clear Clear    Glucose Urine Negative Negative mg/dL    Bilirubin Urine Negative Negative    Ketones Urine Negative Negative mg/dL    Specific Gravity Urine 1.030 1.003 - 1.035    Blood Urine Negative Negative    pH Urine 6.0 5.0 - 7.0    Protein Albumin Urine Negative Negative mg/dL    Urobilinogen Urine Normal Normal, 2.0 mg/dL    Nitrite Urine Negative Negative    Leukocyte Esterase Urine Negative Negative    Mucus Urine Present (A) None Seen /LPF    RBC Urine <1 <=2 /HPF    WBC Urine 1 <=5 /HPF    Squamous Epithelials Urine <1 <=1 /HPF    Narrative    Urine Culture not indicated       Medications - No data to display    Assessments & Plan (with Medical Decision Making)     I have reviewed the nursing notes.    I have reviewed the findings, diagnosis, plan and need for follow up with the patient.  47-year-old female presenting to urgent care with a 2-day history of urinary dysuria, frequency, urgency without fever, aches, chills, flank pain.  Patient denying vaginal sores or lesions and denies being sexually active.  Exam reveals suprapubic tenderness but otherwise unremarkable.  No findings concerning for ureteral process, pyelonephritis.  Patient denying STI concern.  Urinalysis reveals negative nitrites, negative leukocyte esterase, negative white blood cells.  Encourage patient to increase water intake to 64 fluid ounces daily and follow-up in 5 to 7 days if no improvement in symptoms.  Discussed worrisome reasons to return.  Patient discharged in stable condition.    New Prescriptions    No medications on file       Final diagnoses:   Dysuria       7/20/2022   [facility] EMERGENCY DEPT     Lima Sanchez, APRN CNP  07/20/22 2360

## 2022-07-20 NOTE — DISCHARGE INSTRUCTIONS
I recommend increasing your water intake to 64 fluid ounces daily.  Follow-up with urgent care or primary care in 1 week if no improvement in symptoms.  Return sooner if you develop mental confusion or fevers.

## 2022-07-20 NOTE — ED TRIAGE NOTES
Pt c/o inability to void all day. States had s/s of UTI starting 3 days ago with frequency and urgency, has been drinking water states has not voided today since 0530. Discussed urgent care vs ED, pt states in treatment center, was told to come to Urgent care, insisting on urgent care as that was what she was told to do.      Triage Assessment     Row Name 07/20/22 1542       Triage Assessment (Adult)    Airway WDL WDL       Respiratory WDL    Respiratory WDL WDL       Skin Circulation/Temperature WDL    Skin Circulation/Temperature WDL WDL       Cardiac WDL    Cardiac WDL WDL       Peripheral/Neurovascular WDL    Peripheral Neurovascular WDL WDL       Cognitive/Neuro/Behavioral WDL    Cognitive/Neuro/Behavioral WDL WDL

## 2022-10-01 ENCOUNTER — HEALTH MAINTENANCE LETTER (OUTPATIENT)
Age: 47
End: 2022-10-01

## 2023-02-11 ENCOUNTER — HEALTH MAINTENANCE LETTER (OUTPATIENT)
Age: 48
End: 2023-02-11

## 2024-03-09 ENCOUNTER — HEALTH MAINTENANCE LETTER (OUTPATIENT)
Age: 49
End: 2024-03-09

## 2024-07-09 ENCOUNTER — OFFICE VISIT (OUTPATIENT)
Dept: URGENT CARE | Facility: URGENT CARE | Age: 49
End: 2024-07-09
Payer: COMMERCIAL

## 2024-07-09 ENCOUNTER — ANCILLARY PROCEDURE (OUTPATIENT)
Dept: GENERAL RADIOLOGY | Facility: CLINIC | Age: 49
End: 2024-07-09
Attending: PHYSICIAN ASSISTANT
Payer: COMMERCIAL

## 2024-07-09 VITALS
DIASTOLIC BLOOD PRESSURE: 60 MMHG | HEART RATE: 114 BPM | RESPIRATION RATE: 18 BRPM | BODY MASS INDEX: 16.68 KG/M2 | TEMPERATURE: 98.8 F | OXYGEN SATURATION: 98 % | WEIGHT: 85.4 LBS | SYSTOLIC BLOOD PRESSURE: 130 MMHG

## 2024-07-09 DIAGNOSIS — S90.121A CONTUSION OF FIFTH TOE, RIGHT, INITIAL ENCOUNTER: ICD-10-CM

## 2024-07-09 DIAGNOSIS — S99.921A FOOT INJURY, RIGHT, INITIAL ENCOUNTER: Primary | ICD-10-CM

## 2024-07-09 DIAGNOSIS — S99.921A FOOT INJURY, RIGHT, INITIAL ENCOUNTER: ICD-10-CM

## 2024-07-09 PROCEDURE — 99203 OFFICE O/P NEW LOW 30 MIN: CPT | Performed by: PHYSICIAN ASSISTANT

## 2024-07-09 PROCEDURE — 73630 X-RAY EXAM OF FOOT: CPT | Mod: TC | Performed by: RADIOLOGY

## 2024-07-09 RX ORDER — MIRTAZAPINE 7.5 MG/1
1 TABLET, FILM COATED ORAL AT BEDTIME
COMMUNITY
Start: 2024-05-14

## 2024-07-09 RX ORDER — IBUPROFEN 600 MG/1
600 TABLET, FILM COATED ORAL EVERY 8 HOURS PRN
Qty: 30 TABLET | Refills: 0 | Status: CANCELLED | OUTPATIENT
Start: 2024-07-09 | End: 2024-07-19

## 2024-07-09 NOTE — LETTER
July 9, 2024      Angie Franks  [address]   [facility] [address]        To Whom It May Concern:    Angie Franks  was seen on 7/9/2024 for foot contusion.  Please excuse her from treatment today and tomorrow  allow her to sit as needed.        Sincerely,        Haritha Conway PA-C

## 2024-07-10 NOTE — PROGRESS NOTES
Chief Complaint   Patient presents with    Trauma     Hit right foot on Sunday - pain pinky toe to heel        X-ray-I see no obvious fracture    Results for orders placed or performed in visit on 07/09/24   XR Foot Right G/E 3 Views     Status: None    Narrative    EXAM: XR FOOT RIGHT G/E 3 VIEWS  LOCATION: [facility]  DATE: 7/9/2024    INDICATION: Hit foot on head board. Tender lateral foot, mainly pinky toe, pain  COMPARISON: None.      Impression    IMPRESSION: No acute fracture or malalignment. No significant degenerative changes.         ASSESSMENT:    ICD-10-CM    1. Foot injury, right, initial encounter  S99.921A XR Foot Right G/E 3 Views      2. Contusion of fifth toe, right, initial encounter  S90.121A             PLAN: Right pinky toe contusion.  Postop shoe.  Ice, elevate.   Ice and/or Tylenol.  May alternate every 4 hours.  Note for missing treatment today.  Undergoing treatment for heroin addiction.  Tomorrow at treatment please allow to sit as needed, note given.    Haritha Conway PA-C        SUBJECTIVE:  Angie Franks is an 49 year old female who presents with right third and fourth pinky toe pain and distal lateral foot pain after hitting it on the headboard while walking by it 2 days ago.  No numbness or tingling.    Past Medical History:   Diagnosis Date    ASCUS favoring benign 09/28/2015    negative HPV    Bipolar 1 disorder (H)     Fibromyalgia 08/01/2012    Hx of herpes genitalis 09/28/2015    Obesity     Periodic headache syndrome, not intractable     Social anxiety disorder      History   Smoking Status    Former    Packs/day: 0.50    Types: Cigarettes   Smokeless Tobacco    Never       ROS:  GEN no fevers  SKIN no erythema  Musculoskeletal:  See HPI.      OBJECTIVE:  Blood pressure 130/60, pulse 114, temperature 98.8  F (37.1  C), temperature source Oral, resp. rate 18, weight 38.7 kg (85 lb 6.4 oz), SpO2 98%.  Patient is alert and NAD.  EYES:  Telephone Encounter by Peter Garcia at 01/10/18 03:58 PM     Author:  Deena Moss Service:  (none) Author Type:  Patient      Filed:  01/10/18 04:01 PM Encounter Date:  1/10/2018 Status:  Signed     :  Peter Garcia (Patient )            Prior Authorization started in cover my meds    Please sign-on to Cover My Meds and complete the Prior authorization. Message sent to the Prior authorization pool - Please don't close this message. Refill request by:      Prior authorization is required for medication - Prior authorization started online at [facility] My Meds - ID#  .     Insurance Company:       Medication requested to be refilled:[MS1.1T]   doxazosin (CARDURA) 2 MG tablet[MS1.1C]  Qty:[MS1.1T]  30[MS1.1M]        Revision History        User Key Date/Time User Provider Type Action    > MS1.1 01/10/18 04:01 PM Deena Moss Patient  Sign    C - Copied, M - Manual, T - Template conjunctiva clear  Ankle/foot Exam (right):  Inspection/palpation: Right pinky toe swollen.  Tender fourth and fifth MTP areas.  Able to wiggle toes but decreased compared to other side.  Nontender base of fifth metatarsal.  Mildly tender distal fifth metatarsal.    Cap refill intact.    Good doralis pedis.  Neurovascularly Intact Distally.         Haritha Conway PA-C

## 2024-09-05 ENCOUNTER — ANCILLARY PROCEDURE (OUTPATIENT)
Dept: GENERAL RADIOLOGY | Facility: CLINIC | Age: 49
End: 2024-09-05
Attending: PHYSICIAN ASSISTANT
Payer: COMMERCIAL

## 2024-09-05 ENCOUNTER — OFFICE VISIT (OUTPATIENT)
Dept: URGENT CARE | Facility: URGENT CARE | Age: 49
End: 2024-09-05
Payer: COMMERCIAL

## 2024-09-05 VITALS
SYSTOLIC BLOOD PRESSURE: 122 MMHG | TEMPERATURE: 97.7 F | RESPIRATION RATE: 18 BRPM | OXYGEN SATURATION: 95 % | DIASTOLIC BLOOD PRESSURE: 84 MMHG | HEART RATE: 102 BPM | BODY MASS INDEX: 16.72 KG/M2 | WEIGHT: 85.6 LBS

## 2024-09-05 DIAGNOSIS — R50.9 FEVER, UNSPECIFIED FEVER CAUSE: ICD-10-CM

## 2024-09-05 DIAGNOSIS — J18.9 PNEUMONIA OF BOTH LOWER LOBES DUE TO INFECTIOUS ORGANISM: Primary | ICD-10-CM

## 2024-09-05 PROCEDURE — 99214 OFFICE O/P EST MOD 30 MIN: CPT | Performed by: PHYSICIAN ASSISTANT

## 2024-09-05 PROCEDURE — 71046 X-RAY EXAM CHEST 2 VIEWS: CPT | Mod: TC | Performed by: RADIOLOGY

## 2024-09-05 RX ORDER — ZOLPIDEM TARTRATE 10 MG/1
1 TABLET ORAL DAILY
COMMUNITY

## 2024-09-05 RX ORDER — LIDOCAINE 4 G/G
PATCH TOPICAL
COMMUNITY
Start: 2023-10-30

## 2024-09-05 RX ORDER — BENZONATATE 200 MG/1
200 CAPSULE ORAL 3 TIMES DAILY PRN
Qty: 30 CAPSULE | Refills: 0 | Status: SHIPPED | OUTPATIENT
Start: 2024-09-05

## 2024-09-05 RX ORDER — HYDROMORPHONE HYDROCHLORIDE 2 MG/1
2 TABLET ORAL
COMMUNITY
Start: 2023-06-11

## 2024-09-05 RX ORDER — METHADONE HYDROCHLORIDE 10 MG/ML
75 CONCENTRATE ORAL
COMMUNITY
Start: 2024-05-29 | End: 2025-05-29

## 2024-09-05 RX ORDER — MEGESTROL ACETATE 40 MG/ML
400 SUSPENSION ORAL
COMMUNITY
Start: 2023-06-14

## 2024-09-05 RX ORDER — DOXYCYCLINE HYCLATE 100 MG
100 TABLET ORAL 2 TIMES DAILY
Qty: 28 TABLET | Refills: 0 | Status: SHIPPED | OUTPATIENT
Start: 2024-09-05 | End: 2024-09-19

## 2024-09-05 RX ORDER — BUDESONIDE AND FORMOTEROL FUMARATE DIHYDRATE 160; 4.5 UG/1; UG/1
2 AEROSOL RESPIRATORY (INHALATION) 2 TIMES DAILY
COMMUNITY
Start: 2024-01-03

## 2024-09-05 RX ORDER — ALBUTEROL SULFATE 90 UG/1
1-2 AEROSOL, METERED RESPIRATORY (INHALATION)
COMMUNITY
Start: 2023-10-30

## 2024-09-05 ASSESSMENT — ENCOUNTER SYMPTOMS
NECK STIFFNESS: 0
CHEST TIGHTNESS: 0
WEAKNESS: 0
JOINT SWELLING: 0
CARDIOVASCULAR NEGATIVE: 1
RHINORRHEA: 0
VOMITING: 0
ENDOCRINE NEGATIVE: 1
LIGHT-HEADEDNESS: 0
COUGH: 1
CHILLS: 0
WHEEZING: 0
FEVER: 1
NAUSEA: 0
DIARRHEA: 0
HEADACHES: 0
ALLERGIC/IMMUNOLOGIC NEGATIVE: 1
WOUND: 0
ARTHRALGIAS: 0
MYALGIAS: 1
DIZZINESS: 0
SORE THROAT: 0
SHORTNESS OF BREATH: 0
PALPITATIONS: 0
NECK PAIN: 0
BACK PAIN: 0
EYES NEGATIVE: 1

## 2024-09-05 ASSESSMENT — PAIN SCALES - GENERAL: PAINLEVEL: EXTREME PAIN (8)

## 2024-09-05 NOTE — PROGRESS NOTES
Chief Complaint:     Chief Complaint   Patient presents with    Cough     Cough (day 4, productive and getting worse), fever, overall does not feel well      Results for orders placed or performed in visit on 09/05/24   XR Chest 2 Views     Status: None    Narrative    CHEST TWO VIEWS 9/5/2024 2:07 PM     HISTORY: Fever, unspecified fever cause    COMPARISON: None.       Impression    IMPRESSION: Patchy airspace consolidations are seen along the lower  lungs concerning for multifocal pneumonia. A few nodular densities are  also present measuring up to 7 mm in the left lower lung. Recommend  radiographic follow-up to ensure resolution. If findings remain  persistent recommend follow-up CT chest exam.    Normal cardiomediastinal silhouette. No acute bony abnormality.    AMOR COWART MD         SYSTEM ID:  [id number]       Medical Decision Making:    Vital signs reviewed by Jorje Wisdom PA-C  /84 (BP Location: Left arm, Patient Position: Sitting, Cuff Size: Adult Small)   Pulse 102   Temp 97.7  F (36.5  C) (Tympanic)   Resp 18   Wt 38.8 kg (85 lb 9.6 oz)   SpO2 95%   BMI 16.72 kg/m      Differential Diagnosis:  URI Adult/Peds:  Asthma exacerbation, Bronchitis-viral, Influenza, Pneumonia, Viral pharyngitis, and Viral upper respiratory illness        ASSESSMENT    1. Pneumonia of both lower lobes due to infectious organism    2. Fever, unspecified fever cause        PLAN    Patient is in no acute distress.    Temp is 97.7 in clinic today, lung sounds were clear, and O2 sats at 95% on RA.    CXR shows bilateral lower lobe infiltrates per my read. Will treat for pneumonia with Augmentin and Doxycyline due to allergy to Macrolides.     Rest, Push fluids, vaporizer, elevation of head of bed.  Ibuprofen and or Tylenol for any fever or body aches.  Rx for Tessalon cough suppressant- PRN- as discussed.   If symptoms worsen, recheck immediately otherwise follow up with your PCP in 1 week if symptoms are not  improving.  Worrisome symptoms discussed with instructions to go to the ED.  Patient verbalized understanding and agreed with this plan.    36 minutes was spent in the care of this patient including chart review, HPI, ROS, PE, review of plan, and placing of orders.      Labs:    Results for orders placed or performed in visit on 09/05/24   XR Chest 2 Views     Status: None    Narrative    CHEST TWO VIEWS 9/5/2024 2:07 PM     HISTORY: Fever, unspecified fever cause    COMPARISON: None.       Impression    IMPRESSION: Patchy airspace consolidations are seen along the lower  lungs concerning for multifocal pneumonia. A few nodular densities are  also present measuring up to 7 mm in the left lower lung. Recommend  radiographic follow-up to ensure resolution. If findings remain  persistent recommend follow-up CT chest exam.    Normal cardiomediastinal silhouette. No acute bony abnormality.    AMOR COWART MD         SYSTEM ID:  [id number]        Vital signs reviewed by Jorje Wisdom PA-C  /84 (BP Location: Left arm, Patient Position: Sitting, Cuff Size: Adult Small)   Pulse 102   Temp 97.7  F (36.5  C) (Tympanic)   Resp 18   Wt 38.8 kg (85 lb 9.6 oz)   SpO2 95%   BMI 16.72 kg/m      Current Meds      Current Outpatient Medications:     albuterol (PROAIR HFA/PROVENTIL HFA/VENTOLIN HFA) 108 (90 Base) MCG/ACT inhaler, Inhale 1-2 puffs into the lungs., Disp: , Rfl:     amoxicillin-clavulanate (AUGMENTIN) 875-125 MG tablet, Take 1 tablet by mouth 2 times daily for 14 days., Disp: 28 tablet, Rfl: 0    benzonatate (TESSALON) 200 MG capsule, Take 1 capsule (200 mg) by mouth 3 times daily as needed for cough., Disp: 30 capsule, Rfl: 0    doxycycline hyclate (VIBRA-TABS) 100 MG tablet, Take 1 tablet (100 mg) by mouth 2 times daily for 14 days., Disp: 28 tablet, Rfl: 0    budesonide-formoterol (SYMBICORT) 160-4.5 MCG/ACT Inhaler, Inhale 2 puffs into the lungs 2 times daily. (Patient not taking: Reported on  9/5/2024), Disp: , Rfl:     buprenorphine HCl-naloxone HCl (SUBOXONE) 8-2 MG per film, Place 1 Film under the tongue daily (Patient not taking: Reported on 7/9/2024), Disp: 7 Film, Rfl: 0    cholecalciferol 50 MCG (2000 UT) tablet, Take 2,000 Units by mouth (Patient not taking: Reported on 7/9/2024), Disp: , Rfl:     cloNIDine (CATAPRES-TTS1) 0.1 MG/24HR WK patch, , Disp: , Rfl:     HYDROmorphone (DILAUDID) 2 MG tablet, Take 2 mg by mouth. (Patient not taking: Reported on 9/5/2024), Disp: , Rfl:     Lidocaine (LIDOCARE) 4 % Patch, Apply to intact skin to cover most painful area for max 12hr per 24hr period. (Patient not taking: Reported on 9/5/2024), Disp: , Rfl:     megestrol (MEGACE) 40 MG/ML suspension, Take 400 mg by mouth. (Patient not taking: Reported on 9/5/2024), Disp: , Rfl:     methadone (DOLOPHINE-INTENSOL) 10 MG/ML (HIGH CONC) solution, Take 75 mg by mouth. (Patient not taking: Reported on 9/5/2024), Disp: , Rfl:     mirtazapine (REMERON) 7.5 MG tablet, Take 1 tablet by mouth at bedtime (Patient not taking: Reported on 9/5/2024), Disp: , Rfl:     naloxone (NARCAN) 4 MG/0.1ML nasal spray, Spray 1 spray (4 mg) into one nostril alternating nostrils once as needed for opioid reversal every 2-3 minutes until assistance arrives (Patient not taking: Reported on 9/28/2021), Disp: 0.2 mL, Rfl: 11    nicotine (NICODERM CQ) 21 MG/24HR 24 hr patch, , Disp: , Rfl:     QUEtiapine (SEROQUEL) 200 MG tablet, , Disp: , Rfl:     QUEtiapine (SEROQUEL) 25 MG tablet, Take 25 mg by mouth (Patient not taking: Reported on 7/9/2024), Disp: , Rfl:     QUEtiapine (SEROQUEL) 50 MG tablet, , Disp: , Rfl:     thiamine (B-1) 100 MG tablet, Take 100 mg by mouth (Patient not taking: Reported on 7/9/2024), Disp: , Rfl:     vitamin B-12 (CYANOCOBALAMIN) 1000 MCG tablet, Take 1,000 mcg by mouth (Patient not taking: Reported on 7/9/2024), Disp: , Rfl:     zolpidem (AMBIEN) 10 MG tablet, Take 1 tablet by mouth daily. (Patient not taking:  Reported on 9/5/2024), Disp: , Rfl:       Respiratory History    pneumonia, asthma, and tobacco abuse      SUBJECTIVE    HPI: Angie Franks is an 49 year old female who presents with cough productive green and nasal congestion.  Symptoms began 4  days ago and has unchanged.  There is no shortness of breath, wheezing, chest pain, nausea, and vomiting.  Patient is eating and drinking well.  No fever, nausea, vomiting, or diarrhea.    Patient denies any recent travel or exposure to known COVID positive tested individual.      ROS:     Review of Systems   Constitutional:  Positive for fever. Negative for chills.   HENT:  Positive for congestion. Negative for ear pain, rhinorrhea and sore throat.    Eyes: Negative.    Respiratory:  Positive for cough. Negative for chest tightness, shortness of breath and wheezing.    Cardiovascular: Negative.  Negative for chest pain and palpitations.   Gastrointestinal:  Negative for diarrhea, nausea and vomiting.   Endocrine: Negative.    Genitourinary: Negative.    Musculoskeletal:  Positive for myalgias. Negative for arthralgias, back pain, joint swelling, neck pain and neck stiffness.   Skin: Negative.  Negative for rash and wound.   Allergic/Immunologic: Negative.  Negative for immunocompromised state.   Neurological:  Negative for dizziness, weakness, light-headedness and headaches.         Family History   Family History   Problem Relation Age of Onset    Substance Abuse Mother         Problem history  Patient Active Problem List   Diagnosis    Hx of herpes genitalis        Allergies  Allergies   Allergen Reactions    Codeine Sulfate Hives    Erythromycin     Morphine Sulfate Other (See Comments)     Localized redness if given IV          Social History  Social History     Socioeconomic History    Marital status:      Spouse name: Not on file    Number of children: Not on file    Years of education: Not on file    Highest education level: Not on file   Occupational  History    Not on file   Tobacco Use    Smoking status: Every Day     Current packs/day: 0.50     Types: Cigarettes    Smokeless tobacco: Never   Substance and Sexual Activity    Alcohol use: No    Drug use: Yes     Types: Opiates, Methamphetamines, Benzodiazepines, Marijuana    Sexual activity: Never   Other Topics Concern    Not on file   Social History Narrative    Not on file     Social Determinants of Health     Financial Resource Strain: Medium Risk (7/6/2022)    [facility]    Financial Resource Strain     Difficulty of Paying Living Expenses: Not on file     Difficulty of Paying Living Expenses: 3   Food Insecurity: Food Insecurity Present (7/6/2022)    [facility]    Food Insecurity     Worried About Running Out of Food in the Last Year: 2   Transportation Needs: Unmet Transportation Needs (7/6/2022)    [facility]    Transportation Needs     Lack of Transportation (Medical): 2   Physical Activity: Not on file   Stress: Not on file   Social Connections: Unknown (7/13/2023)    [facility]    Social Connections     Frequency of Communication with Friends and Family: Not on file   Interpersonal Safety: Not on file   Housing Stability: Low Risk  (7/6/2022)    Received from [facility]    Housing Stability     Unable to Pay for Housing in the Last Year: 1        OBJECTIVE     Vital signs reviewed by Jorje Wisdom PA-C  /84 (BP Location: Left arm, Patient Position: Sitting, Cuff Size: Adult Small)   Pulse 102   Temp 97.7  F (36.5  C) (Tympanic)   Resp 18   Wt 38.8 kg (85 lb 9.6 oz)   SpO2 95%   BMI 16.72 kg/m       Physical Exam  Vitals and nursing note reviewed.   Constitutional:       General: She is not in acute distress.     Appearance: She is well-developed. She is  ill-appearing. She is not toxic-appearing or diaphoretic.   HENT:      Head: Normocephalic and atraumatic.      Right Ear: Hearing, tympanic membrane, ear canal and external ear normal. Tympanic membrane is not perforated, erythematous, retracted or bulging.      Left Ear: Hearing, tympanic membrane, ear canal and external ear normal. Tympanic membrane is not perforated, erythematous, retracted or bulging.      Nose: Congestion and rhinorrhea present. No mucosal edema.      Right Sinus: No maxillary sinus tenderness or frontal sinus tenderness.      Left Sinus: No maxillary sinus tenderness or frontal sinus tenderness.      Mouth/Throat:      Pharynx: No pharyngeal swelling, oropharyngeal exudate, posterior oropharyngeal erythema or uvula swelling.      Tonsils: No tonsillar exudate or tonsillar abscesses. 0 on the right. 0 on the left.   Eyes:      General:         Right eye: No discharge.         Left eye: No discharge.      Pupils: Pupils are equal, round, and reactive to light.   Cardiovascular:      Rate and Rhythm: Regular rhythm.      Heart sounds: Normal heart sounds. No murmur heard.     No friction rub. No gallop.   Pulmonary:      Effort: Pulmonary effort is normal. No respiratory distress.      Breath sounds: Normal breath sounds. No decreased breath sounds, wheezing, rhonchi or rales.   Chest:      Chest wall: No tenderness.   Abdominal:      General: Bowel sounds are normal. There is no distension.      Palpations: Abdomen is soft. There is no mass.      Tenderness: There is no abdominal tenderness. There is no guarding.   Musculoskeletal:      Cervical back: Normal range of motion and neck supple.   Lymphadenopathy:      Head:      Right side of head: No submental, submandibular, tonsillar, preauricular or posterior auricular adenopathy.      Left side of head: No submental, submandibular, tonsillar, preauricular or posterior auricular adenopathy.      Cervical: No cervical adenopathy.      Right  cervical: No superficial or posterior cervical adenopathy.     Left cervical: No superficial or posterior cervical adenopathy.   Skin:     General: Skin is warm and dry.      Findings: No rash.   Neurological:      Mental Status: She is alert and oriented to person, place, and time.      Cranial Nerves: No cranial nerve deficit.      Deep Tendon Reflexes: Reflexes are normal and symmetric.   Psychiatric:         Behavior: Behavior normal. Behavior is cooperative.         Thought Content: Thought content normal.         Judgment: Judgment normal.           Jorje Wisdom PA-C  9/5/2024, 1:37 PM

## 2025-02-07 PROBLEM — M54.42 CHRONIC BILATERAL LOW BACK PAIN WITH LEFT-SIDED SCIATICA: Status: ACTIVE | Noted: 2024-05-16

## 2025-02-07 PROBLEM — B00.9 HSV-2 (HERPES SIMPLEX VIRUS 2) INFECTION: Status: ACTIVE | Noted: 2021-10-07

## 2025-02-07 PROBLEM — D50.8 IRON DEFICIENCY ANEMIA SECONDARY TO INADEQUATE DIETARY IRON INTAKE: Status: ACTIVE | Noted: 2021-10-14

## 2025-02-07 PROBLEM — F11.20 OPIOID DEPENDENCE (H): Status: ACTIVE | Noted: 2022-06-07

## 2025-02-07 PROBLEM — R63.6 UNDERWEIGHT: Status: ACTIVE | Noted: 2024-05-16

## 2025-02-07 PROBLEM — E87.6 HYPOKALEMIA: Status: ACTIVE | Noted: 2023-06-09

## 2025-02-07 PROBLEM — G89.29 CHRONIC BILATERAL LOW BACK PAIN WITH LEFT-SIDED SCIATICA: Status: ACTIVE | Noted: 2024-05-16

## 2025-02-07 PROBLEM — J69.0 ASPIRATION PNEUMONIA (H): Status: ACTIVE | Noted: 2023-06-09

## 2025-02-07 PROBLEM — R60.9 SOFT TISSUE SWELLING OF BACK: Status: ACTIVE | Noted: 2024-05-16

## 2025-02-07 PROBLEM — F19.10 POLYSUBSTANCE ABUSE (H): Status: ACTIVE | Noted: 2022-05-01

## 2025-02-07 PROBLEM — U07.1 COVID: Status: ACTIVE | Noted: 2022-05-25

## 2025-02-07 PROBLEM — N89.8 VAGINAL DISCHARGE: Status: ACTIVE | Noted: 2019-08-22

## 2025-02-07 PROBLEM — F15.21 METHAMPHETAMINE USE DISORDER, SEVERE, IN EARLY REMISSION (H): Status: ACTIVE | Noted: 2019-07-30

## 2025-02-07 PROBLEM — R79.89 ELEVATED BRAIN NATRIURETIC PEPTIDE (BNP) LEVEL: Status: ACTIVE | Noted: 2023-06-09

## 2025-02-07 PROBLEM — K76.0 NONALCOHOLIC HEPATOSTEATOSIS: Status: ACTIVE | Noted: 2019-08-07

## 2025-02-07 PROBLEM — Z59.71 UNINSURED: Status: ACTIVE | Noted: 2024-03-29

## 2025-02-07 PROBLEM — F11.20 HEROIN ADDICTION (H): Status: ACTIVE | Noted: 2023-06-09

## 2025-02-07 PROBLEM — F31.9 BIPOLAR DISORDER (H): Status: ACTIVE | Noted: 2019-07-30

## 2025-02-07 PROBLEM — J45.41 MODERATE PERSISTENT ASTHMA WITH ACUTE EXACERBATION: Status: ACTIVE | Noted: 2021-12-09

## 2025-02-07 PROBLEM — K25.9 MULTIPLE GASTRIC ULCERS: Status: ACTIVE | Noted: 2019-07-29

## 2025-02-07 PROBLEM — M25.562 POSTERIOR LEFT KNEE PAIN: Status: ACTIVE | Noted: 2019-08-22

## 2025-02-07 PROBLEM — J42 CHRONIC BRONCHITIS, UNSPECIFIED CHRONIC BRONCHITIS TYPE (H): Status: ACTIVE | Noted: 2022-08-25

## 2025-02-07 PROBLEM — E43 SEVERE PROTEIN-CALORIE MALNUTRITION: Status: ACTIVE | Noted: 2023-06-09

## 2025-02-07 PROBLEM — J96.01 ACUTE RESPIRATORY FAILURE WITH HYPOXIA (H): Status: ACTIVE | Noted: 2023-06-09

## 2025-02-07 PROBLEM — Z98.84 HISTORY OF ROUX-EN-Y GASTRIC BYPASS: Status: ACTIVE | Noted: 2019-08-07

## 2025-02-07 PROBLEM — R45.1 AGITATION: Status: ACTIVE | Noted: 2022-05-25

## 2025-02-07 PROBLEM — Z00.00 HEALTHCARE MAINTENANCE: Status: ACTIVE | Noted: 2019-08-07

## 2025-02-07 PROBLEM — F39 MOOD DISORDER: Status: ACTIVE | Noted: 2019-08-07

## 2025-02-07 PROBLEM — Z98.890 S/P PANNICULECTOMY: Status: ACTIVE | Noted: 2020-01-06

## 2025-02-07 PROBLEM — F15.929: Status: ACTIVE | Noted: 2022-06-07

## 2025-02-07 PROBLEM — R23.8 PAPULE OF SKIN: Status: ACTIVE | Noted: 2019-08-22

## 2025-02-16 ENCOUNTER — HEALTH MAINTENANCE LETTER (OUTPATIENT)
Age: 50
End: 2025-02-16

## 2025-03-16 ENCOUNTER — HEALTH MAINTENANCE LETTER (OUTPATIENT)
Age: 50
End: 2025-03-16